# Patient Record
(demographics unavailable — no encounter records)

---

## 2025-07-23 NOTE — ASSESSMENT
[FreeTextEntry1] : EKG NSR IRBBB, abnormal EKG  A/P 1. chest pain 2. abnormal EKG HX as  above No ASCVD risk factors  Atypical non exertional symptoms as noted, improved after PPI  Exam unremarkable  EKG as noted, concern re right sided findings Echo today nl LV and RV For now - continue PPI - DDdimer: if elevated need formal evaluation

## 2025-07-23 NOTE — HISTORY OF PRESENT ILLNESS
[FreeTextEntry1] : 38 M CP   Cardiac risk factors/ diagnoses:   HTN Diabetes  Mellitus Hyperlipidemia Tobacco Use Family history premature or other atherosclerotic heart disease   Denies  shortness of breath, orthopnea, PND palpitations or edema.  Basically well, vigourous exercise without limitation Hx GERD, increase in sx over past week, seen by PMD today for L CP w radiation to L neck and shoulder. Relalted to GERD symptoms, started OTC PPI w > 80% subjective improvement in past 24 hours. Played tennis, workout in gym yesterday with no exertional symptoms at all. Of note, 12 hour plane trip last week. No leg pains.